# Patient Record
Sex: FEMALE | Race: WHITE
[De-identification: names, ages, dates, MRNs, and addresses within clinical notes are randomized per-mention and may not be internally consistent; named-entity substitution may affect disease eponyms.]

---

## 2021-02-26 ENCOUNTER — HOSPITAL ENCOUNTER (EMERGENCY)
Dept: HOSPITAL 43 - DL.ED | Age: 31
Discharge: HOME | End: 2021-02-26
Payer: COMMERCIAL

## 2021-02-26 DIAGNOSIS — Z79.82: ICD-10-CM

## 2021-02-26 DIAGNOSIS — Z3A.29: ICD-10-CM

## 2021-02-26 DIAGNOSIS — O23.43: Primary | ICD-10-CM

## 2021-02-26 LAB
ANION GAP SERPL CALC-SCNC: 14.1 MEQ/L (ref 7–13)
CHLORIDE SERPL-SCNC: 100 MMOL/L (ref 98–107)
SODIUM SERPL-SCNC: 136 MMOL/L (ref 136–145)

## 2021-02-26 PROCEDURE — 85025 COMPLETE CBC W/AUTO DIFF WBC: CPT

## 2021-02-26 PROCEDURE — 87186 SC STD MICRODIL/AGAR DIL: CPT

## 2021-02-26 PROCEDURE — 87086 URINE CULTURE/COLONY COUNT: CPT

## 2021-02-26 PROCEDURE — 81001 URINALYSIS AUTO W/SCOPE: CPT

## 2021-02-26 PROCEDURE — 87088 URINE BACTERIA CULTURE: CPT

## 2021-02-26 PROCEDURE — 36415 COLL VENOUS BLD VENIPUNCTURE: CPT

## 2021-02-26 PROCEDURE — 99283 EMERGENCY DEPT VISIT LOW MDM: CPT

## 2021-02-26 PROCEDURE — 80053 COMPREHEN METABOLIC PANEL: CPT

## 2021-02-26 NOTE — EDM.PDOC
ED HPI GENERAL MEDICAL PROBLEM





- General


Chief Complaint: Genitourinary Problem


Stated Complaint: UTI


Time Seen by Provider: 21 19:30


Source of Information: Reports: Patient


History Limitations: Reports: No Limitations





- History of Present Illness


INITIAL COMMENTS - FREE TEXT/NARRATIVE: 





ED with c/o urnary frequency and burnign, started this am and progressed through

day. LMP 20. Estimates 29 weeks pregnant. Good fetal movement.  hx of UTI's 

in past.  Primary OB in [place]. Scheduled C section .  no flank pain, No 

nausea or vomiting, No contractions or spotting.


  ** Bladder


Pain Score (Numeric/FACES): 3





- Related Data


                                    Allergies











Allergy/AdvReac Type Severity Reaction Status Date / Time


 


No Known Allergies Allergy   Verified 21 19:27











Home Meds: 


                                    Home Meds





Pnv No.95/Ferrous Fum/Folic AC [Prenatal Vitamins Tablet] 1 tab PO DAILY 

21 [History]


Aspirin [Halfprin] 81 mg PO DAILY 21 [History]











Past Medical History


OB/GYN History: Reports: Pregnancy


Endocrine/Metabolic History: Reports: Diabetes, Gestational





- Past Surgical History


Female  Surgical History: Reports:  Section





Social & Family History





- Tobacco Use


Tobacco Use Status *Q: Never Tobacco User


Second Hand Smoke Exposure: No





- Caffeine Use


Caffeine Use: Reports: Soda





- Recreational Drug Use


Recreational Drug Use: No





ED ROS GENERAL





- Review of Systems


Review Of Systems: Comprehensive ROS is negative, except as noted in HPI.





ED EXAM, RENAL/





- Physical Exam


Exam: See Below


Exam Limited By: No Limitations


General Appearance: Alert, No Apparent Distress, Obese


Eye Exam: Bilateral Eye: EOMI


Ears: Normal External Exam


Nose: Normal Inspection


Throat/Mouth: Normal Inspection


Head: Atraumatic, Normocephalic


Neck: Normal Inspection


Respiratory/Chest: No Respiratory Distress, Lungs Clear, Normal Breath Sounds


Cardiovascular: Regular Rate, Rhythm


GI/Abdominal: Normal Bowel Sounds, Soft


Back Exam: No: CVA Tenderness (L), CVA Tenderness (R)


Neurological: Alert, Oriented


Psychiatric: Normal Affect


Skin Exam: Warm, Dry, Intact, Normal Color





Course





- Vital Signs


Last Recorded V/S: 


                                Last Vital Signs











Temp  98 F   21 19:34


 


Pulse  101 H  21 19:34


 


Resp  16   21 19:34


 


BP  144/73 H  21 19:34


 


Pulse Ox  99   21 19:34














- Orders/Labs/Meds


Orders: 


                               Active Orders 24 hr











 Category Date Time Status


 


 CULTURE URINE [RM] Stat Lab  21 19:22 Received











Labs: 


                                Laboratory Tests











  21 Range/Units





  19:22 19:32 19:32 


 


WBC   14.4 H   (5.0-10.0)  10^3/uL


 


RBC   4.21   (4.2-5.4)  10^6/uL


 


Hgb   12.2   (12.0-16.0)  g/dL


 


Hct   36.6 L   (37.0-47.0)  %


 


MCV   86.9   ()  fL


 


MCH   29.0   (27.0-34.0)  pg


 


MCHC   33.3   (33.0-35.0)  g/dL


 


Plt Count   343   (150-450)  10^3/uL


 


Neut % (Auto)   64.7   (42.2-75.2)  %


 


Lymph % (Auto)   27.7   (20.5-50.1)  %


 


Mono % (Auto)   6.3   (2-8)  %


 


Eos % (Auto)   1.2   (1.0-3.0)  %


 


Baso % (Auto)   0.1   (0.0-1.0)  %


 


Sodium    136  (136-145)  mmol/L


 


Potassium    4.1  (3.5-5.1)  mmol/L


 


Chloride    100  ()  mmol/L


 


Carbon Dioxide    26  (21-32)  mmol/L


 


Anion Gap    14.1 H  (7-13)  mEq/L


 


BUN    6 L  (7-18)  mg/dL


 


Creatinine    0.49 L  (0.55-1.02)  mg/dL


 


Est Cr Clr Drug Dosing    160.29  mL/min


 


Estimated GFR (MDRD)    > 60  


 


BUN/Creatinine Ratio    12.2  (No establ ref range)  


 


Glucose    106 H  (74-99)  mg/dL


 


Calcium    8.9  (8.5-10.1)  mg/dL


 


Total Bilirubin    0.4  (0.2-1.0)  mg/dL


 


AST    7 L  (15-37)  U/L


 


ALT    15  (14-59)  U/L


 


Alkaline Phosphatase    81  ()  U/L


 


Total Protein    7.1  (6.4-8.2)  g/dL


 


Albumin    2.7 L  (3.4-5.0)  g/dL


 


Globulin    4.4  


 


Albumin/Globulin Ratio    0.61  


 


Urine Color  Dark yellow    (YELLOW)  


 


Urine Appearance  Slightly cloudy    (CLEAR)  


 


Urine pH  6.0    (5.0-9.0)  


 


Ur Specific Gravity  >= 1.030    (1.005-1.030)  


 


Urine Protein  30 H    (NEGATIVE)  


 


Urine Glucose (UA)  Negative    (NEGATIVE)  


 


Urine Ketones  Negative    (NEGATIVE)  


 


Urine Occult Blood  Moderate H    (NEGATIVE)  


 


Urine Nitrite  Negative    (NEGATIVE)  


 


Urine Bilirubin  Negative    (NEGATIVE)  


 


Urine Urobilinogen  0.2    (0.2-1.0)  mg/dL


 


Ur Leukocyte Esterase  Trace H    (NEGATIVE)  


 


Urine RBC  40-50 H    /HPF


 


Urine WBC  20-30 H    (0-5/HPF)  /HPF


 


Ur Epithelial Cells  Few    (NOT SEEN)  /HPF


 


Urine Bacteria  Many H    (0-FEW/HPF)  /HPF











Meds: 


Medications














Discontinued Medications














Generic Name Dose Route Start Last Admin





  Trade Name Freq  PRN Reason Stop Dose Admin


 


Cephalexin  500 mg  21 20:09  21 20:26





  Keflex  PO  21 20:10  500 mg





  ONETIME ONE   Administration














Departure





- Departure


Time of Disposition: 20:13


Disposition: Home, Self-Care 01


Condition: Good


Clinical Impression: 


 UTI, Urinary tract infectious disease, Second trimester pregnancy








- Discharge Information


*PRESCRIPTION DRUG MONITORING PROGRAM REVIEWED*: No


*COPY OF PRESCRIPTION DRUG MONITORING REPORT IN PATIENT DYLAN: No


Instructions:  Urinary Tract Infection, Adult, Easy-to-Read


Forms:  ED Department Discharge


Additional Instructions: 


increase fluids


follow up with OB on Monday - at least to update via phone


keflex 500mg one twice daily


Urgent follow up if fever, decreased fetal movment, contractions,  or spotting





Sepsis Event Note (ED)





- Evaluation


Sepsis Screening Result: No Definite Risk





- Focused Exam


Vital Signs: 


                                   Vital Signs











  Temp Pulse Resp BP Pulse Ox


 


 21 19:34  98 F  101 H  16  144/73 H  99














- My Orders


Last 24 Hours: 


My Active Orders





21 19:22


CULTURE URINE [RM] Stat 














- Assessment/Plan


Last 24 Hours: 


My Active Orders





21 19:22


CULTURE URINE [RM] Stat